# Patient Record
(demographics unavailable — no encounter records)

---

## 2025-06-09 NOTE — END OF VISIT
[Time Spent: ___ minutes] : I have spent [unfilled] minutes of time on the encounter which excludes teaching and separately reported services. [FreeTextEntry3] :  I, Dr. Davidson, personally performed the evaluation and management (E/M) services for this  new patient. That E/M includes conducting the clinically appropriate initial history &/or exam, assessing  all conditions, and establishing the plan of care. Today, my HERB, was here to observe  my evaluation and management service for this patient & follow plan of care established by me going  forward.

## 2025-06-09 NOTE — END OF VISIT
[FreeTextEntry3] :  I, Dr. Davidson, personally performed the evaluation and management (E/M) services for this  new patient. That E/M includes conducting the clinically appropriate initial history &/or exam, assessing  all conditions, and establishing the plan of care. Today, my HERB, was here to observe  my evaluation and management service for this patient & follow plan of care established by me going  forward.   [Time Spent: ___ minutes] : I have spent [unfilled] minutes of time on the encounter which excludes teaching and separately reported services.

## 2025-06-09 NOTE — ASSESSMENT
[FreeTextEntry1] : Patient presents today with concerns for capsular contracture.  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  I do believe she would benefit from total capsulectomy and implant removal.  The patient desires to have her implants removed and no further implants placed.  I do believe this would be reasonable given the numerous surgeries surgeries she has had to correct this issue previously.  I do believe we could open part of her previous Green pattern along the inframammary fold and remove the implants.  The patient seems to be in agreement with this and is ready to proceed.  Will plan to get it authorized with insurance and schedule.

## 2025-06-09 NOTE — PHYSICAL EXAM
[de-identified] : Patient with well-healed bilateral Wise pattern incisions on the breast.  She does have grade 2 ptosis.  She does have bilateral grade 3-4 capsular contracture with pain and tenderness along with firmness.  The left side is worse than the right side.  Overall the implants do feel firm.  I do believe based on my examination they are in the subglandular position.

## 2025-06-09 NOTE — HISTORY OF PRESENT ILLNESS
[FreeTextEntry1] : This patient is a 31 year old female here today for consultation for removal of implants and a possible lift or VIGNESH flaps.  Patient presents today with complaints of capsular contracture.  In  she underwent augmentation mastopexy in [place].  This was complicated by capsular contracture of the right breast leading to elevation and pain of the right breast implant.  She subsequently underwent revision in  of this capsular contracture.  Now for the last 1 year she is complaining of bilateral breast pain with the left worse than the right and firmness and pain.  She is concerned she is having capsular contracture again.  On past medical history is significant for high cholesterol and prediabetes  She has had numerous surgeries before including abdominoplasty liposuction endometriosis resection along with  and D&C.  She reports that she is wearing a D cup bra right now and has silicone implants but is on sure of the size or cleaning.  She does believe they are formed stable shape the implants.  She is a non-smoker but an everyday marijuana smoker.

## 2025-06-09 NOTE — PHYSICAL EXAM
[de-identified] : Patient with well-healed bilateral Wise pattern incisions on the breast.  She does have grade 2 ptosis.  She does have bilateral grade 3-4 capsular contracture with pain and tenderness along with firmness.  The left side is worse than the right side.  Overall the implants do feel firm.  I do believe based on my examination they are in the subglandular position.

## 2025-06-10 NOTE — HISTORY OF PRESENT ILLNESS
[FreeTextEntry1] : The patient is a pleasant 31-year-old female here today to discuss revisional breast surgery.  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  She is a former smoker and vapor.  Currently smokes marijuana.  Surgical history includes a bilateral augmentation mastopexy with silicone implants placed in 2020 by plastic surgeon in [place].  She had capsular contracture of the right breast and had bilateral implant replacement in 2022.  She then went on to develop capsular contracture of the left breast which is her current complaint today: Elevated firm and painful left breast implant.  Implant cards presented today are [place] 425 cc.  She believes they are in the submuscular position.  She is also had a tummy tuck performed liposuction.  Since that surgery she has put on 80 pounds.  She is interested in implant removal.

## 2025-06-10 NOTE — PHYSICAL EXAM
[TextEntry] : Physical Exam General: No acute distress, well-appearing Neuro: Alert and oriented x3, no focal deficits noted Psych: Appropriate mood and affect HEENT: Normocephalic, atraumatic Respiratory: Breathing comfortably on room air, normal effort and expansion Cardio: Regular rate by radial pulse, no cyanosis Extremities: Warm well-perfused, moving all     Breast: Right breast capsule grade 1, left breast capsule grade 4.  Nipple areolar complex is widened with grade 2 ptosis.  There is a palpable suture that appears to be some kind of pursestring or cerclage around the areola bilaterally which is painful to the patient.  She has widened Wise pattern scars bilaterally.  No evidence of fluid collection or acute infection.  SNN   R = 26.5 and L =25.5  Well-healed umbilical and low abdominal scars  All breast exams are performed with a female chaperone present

## 2025-06-10 NOTE — REVIEW OF SYSTEMS
[TextEntry] : A 10 point review of systems was conducted and was found to be negative except what was noted in the HPI.

## 2025-07-16 NOTE — PLAN
[TextEntry] : Patient presents today with history of augmentation mastopexy complicated by capsular contracture early status post revisional breast surgery with again early capsular contracture on the left side.  Patient is status post removal of right intact and a ruptured left breast implants.  Routine healing.  Drains were removed for low output and well-tolerated today.  New dressing placed.  Discussed activity precautions and scar care.  Return to clinic in 3 months.

## 2025-07-16 NOTE — HISTORY OF PRESENT ILLNESS
[FreeTextEntry1] : The patient is a pleasant 31-year-old female here today to discuss revisional breast surgery.  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  She is a former smoker and vapor.  Currently smokes marijuana.  Surgical history includes a bilateral augmentation mastopexy with silicone implants placed in 2020 by plastic surgeon in [place].  She had capsular contracture of the right breast and had bilateral implant replacement in 2022.  She then went on to develop capsular contracture of the left breast which is her current complaint today: Elevated firm and painful left breast implant.  Implant cards presented today are [place] 425 cc.  She believes they are in the submuscular position.  She is also had a tummy tuck performed liposuction.  Since that surgery she has put on 80 pounds.  She is interested in implant removal.  Clinic 7/16/2025: Patient presents for routine postop.  She feels well and has minimal pain.  Drains have been putting out 30 cc or less for the past 2 days and are completely serous.  She says she has some firmness in the bilateral breast.  Otherwise she feels much better than prior to surgery.

## 2025-07-16 NOTE — PHYSICAL EXAM
[TextEntry] : Physical Exam General: No acute distress, well-appearing Neuro: Alert and oriented x3, no focal deficits noted Psych: Appropriate mood and affect HEENT: Normocephalic, atraumatic Respiratory: Breathing comfortably on room air, normal effort and expansion Cardio: Regular rate by radial pulse, no cyanosis Extremities: Warm well-perfused, moving all     Breast: Bilateral breasts are soft with some dense breast tissue bilaterally in the upper outer pole.  No hematoma.  Nipple areolar complexes are viable and sensate.  IMF incisions dressed with Steri-Strips clean dry.  Drains are both completely serous.  Breasts are appropriately tender  Well-healed umbilical and low abdominal scars  All breast exams are performed with a female chaperone present